# Patient Record
Sex: FEMALE | Race: WHITE | NOT HISPANIC OR LATINO | URBAN - METROPOLITAN AREA
[De-identification: names, ages, dates, MRNs, and addresses within clinical notes are randomized per-mention and may not be internally consistent; named-entity substitution may affect disease eponyms.]

---

## 2017-07-16 ENCOUNTER — EMERGENCY (EMERGENCY)
Facility: HOSPITAL | Age: 52
LOS: 0 days | Discharge: HOME | End: 2017-07-16

## 2017-07-16 DIAGNOSIS — M25.561 PAIN IN RIGHT KNEE: ICD-10-CM

## 2017-08-03 ENCOUNTER — OUTPATIENT (OUTPATIENT)
Dept: OUTPATIENT SERVICES | Facility: HOSPITAL | Age: 52
LOS: 1 days | Discharge: HOME | End: 2017-08-03

## 2017-08-03 DIAGNOSIS — N93.9 ABNORMAL UTERINE AND VAGINAL BLEEDING, UNSPECIFIED: ICD-10-CM

## 2018-10-04 ENCOUNTER — EMERGENCY (EMERGENCY)
Facility: HOSPITAL | Age: 53
LOS: 0 days | Discharge: HOME | End: 2018-10-04
Attending: EMERGENCY MEDICINE | Admitting: EMERGENCY MEDICINE

## 2018-10-04 VITALS — WEIGHT: 139.99 LBS | HEIGHT: 63 IN

## 2018-10-04 VITALS
RESPIRATION RATE: 18 BRPM | DIASTOLIC BLOOD PRESSURE: 84 MMHG | OXYGEN SATURATION: 98 % | HEART RATE: 66 BPM | SYSTOLIC BLOOD PRESSURE: 131 MMHG
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
## 2018-10-04 DIAGNOSIS — M79.621 PAIN IN RIGHT UPPER ARM: ICD-10-CM
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
## 2018-10-04 DIAGNOSIS — M62.838 OTHER MUSCLE SPASM: ICD-10-CM

## 2018-10-04 DIAGNOSIS — S29.012A STRAIN OF MUSCLE AND TENDON OF BACK WALL OF THORAX, INITIAL ENCOUNTER: ICD-10-CM
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
## 2018-10-04 DIAGNOSIS — M54.9 DORSALGIA, UNSPECIFIED: ICD-10-CM

## 2018-10-04 RX ORDER — TIZANIDINE 4 MG/1
1 TABLET ORAL
Qty: 12 | Refills: 0 | OUTPATIENT
Start: 2018-10-04 | End: 2018-10-06

## 2018-10-04 NOTE — ED PROVIDER NOTE - PHYSICAL EXAMINATION
Vital Signs: I have reviewed the initial vital signs.  Constitutional: well-nourished, no acute distress, normocephalic  Eyes: PERRLA, EOMI, no nystagmus, clear conjunctiva  ENT: MMM, TM b/l clear , no nasal congestion  Cardiovascular: regular rate, regular rhythm, no murmur appreciated  Respiratory: unlabored respiratory effort, clear to auscultation bilaterally  Gastrointestinal: soft, non-tender, non-distended  abdomen, no pulsatile mass  Musculoskeletal: supple neck, no lower extremity edema, no bony tenderness , +tenderness to left trapezius   Integumentary: warm, dry, no rash  Neurologic: awake, alert, cranial nerves II-XII grossly intact, extremities’ motor and sensory functions grossly intact, no focal deficits, GCS 15  Psychiatric: appropriate mood, appropriate affect
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
## 2018-10-04 NOTE — ED PROVIDER NOTE - NS ED ROS FT
Review of Systems    Constitutional: (-) fever/ chills (-) weight loss  Eyes/ENT: (-) blurry vision, (-) epistaxis (-) sore throat (-) ear pain  Cardiovascular: (-) chest pain, (-) syncope (-) palpitations  Respiratory: (-) cough, (-) shortness of breath  Gastrointestinal: (-) vomiting, (-) diarrhea (-) abdominal pain  Musculoskeletal: (-) neck pain, (+) back pain, (-) joint pain (-) pedal edema   Integumentary: (-) rash, (-) swelling  Neurological: (-) headache, (-) altered mental status  Psychiatric: (-) hallucinations or depression   Allergic/Immunologic: (-) pruritus

## 2022-03-30 ENCOUNTER — EMERGENCY (EMERGENCY)
Facility: HOSPITAL | Age: 57
LOS: 0 days | Discharge: HOME | End: 2022-03-30
Attending: EMERGENCY MEDICINE | Admitting: EMERGENCY MEDICINE
Payer: COMMERCIAL

## 2022-03-30 VITALS
HEIGHT: 63 IN | SYSTOLIC BLOOD PRESSURE: 124 MMHG | DIASTOLIC BLOOD PRESSURE: 61 MMHG | WEIGHT: 130.95 LBS | TEMPERATURE: 97 F | OXYGEN SATURATION: 100 % | HEART RATE: 83 BPM | RESPIRATION RATE: 18 BRPM

## 2022-03-30 VITALS
RESPIRATION RATE: 18 BRPM | HEART RATE: 81 BPM | SYSTOLIC BLOOD PRESSURE: 130 MMHG | OXYGEN SATURATION: 99 % | TEMPERATURE: 97 F | DIASTOLIC BLOOD PRESSURE: 72 MMHG
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
## 2022-03-30 DIAGNOSIS — R51.9 HEADACHE, UNSPECIFIED: ICD-10-CM

## 2022-03-30 DIAGNOSIS — S09.90XA UNSPECIFIED INJURY OF HEAD, INITIAL ENCOUNTER: ICD-10-CM
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
## 2022-03-30 PROCEDURE — 72125 CT NECK SPINE W/O DYE: CPT | Mod: 26,MA

## 2022-03-30 PROCEDURE — 70450 CT HEAD/BRAIN W/O DYE: CPT | Mod: 26,MA

## 2022-03-30 PROCEDURE — 99285 EMERGENCY DEPT VISIT HI MDM: CPT

## 2022-03-30 RX ORDER — ONDANSETRON 8 MG/1
4 TABLET, FILM COATED ORAL ONCE
Refills: 0 | Status: COMPLETED | OUTPATIENT
Start: 2022-03-30 | End: 2022-03-30

## 2022-03-30 RX ORDER — ACETAMINOPHEN 500 MG
650 TABLET ORAL ONCE
Refills: 0 | Status: COMPLETED | OUTPATIENT
Start: 2022-03-30 | End: 2022-03-30

## 2022-03-30 RX ADMIN — ONDANSETRON 4 MILLIGRAM(S): 8 TABLET, FILM COATED ORAL at 20:37

## 2022-03-30 RX ADMIN — Medication 650 MILLIGRAM(S): at 20:37

## 2022-03-30 NOTE — ED PROVIDER NOTE - NS ED ROS FT
Review of Systems:  	•	CONSTITUTIONAL - no fever, no diaphoresis, no chills    	•	RESPIRATORY - no shortness of breath, no cough  	•	CARDIAC - no chest pain, no palpitations    	•	MUSCULOSKELETAL - no joint paint, no swelling, no redness  	•	NEUROLOGIC - no weakness, positive headache, no paresthesias, no LOC
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
## 2022-03-30 NOTE — ED PROVIDER NOTE - NSFOLLOWUPCLINICS_GEN_ALL_ED_FT
[facility] Concussion Program  Concussion Program  [address] [place] [address]   Phone: [phone]  Fax:

## 2022-03-30 NOTE — ED PROVIDER NOTE - NS ED ATTENDING STATEMENT MOD
This was a shared visit with the JUSTINA. I reviewed and verified the documentation and independently performed the documented:
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
## 2022-03-30 NOTE — ED PROVIDER NOTE - PATIENT PORTAL LINK FT
You can access the FollowMyHealth Patient Portal offered by [facility] by registering at the following website: [email or web address]. By joining [employer]’s FollowMyHealth portal, you will also be able to view your health information using other applications (apps) compatible with our system.

## 2022-03-30 NOTE — ED PROVIDER NOTE - PHYSICAL EXAMINATION
--EXAM--  VITAL SIGNS: I have reviewed vs documented at present.  CONSTITUTIONAL: Well-developed; well-nourished; in no acute distress.     NECK: Supple; non tender.  CARD: S1, S2, Regular rate and rhythm.   RESP: No wheezes, rales or rhonchi.    NEURO: Alert, oriented, grossly unremarkable. Strength 5/5 in all extremities. Sensation intact throughout.  PSYCH: Cooperative, appropriate.

## 2022-05-15 ENCOUNTER — EMERGENCY (EMERGENCY)
Facility: HOSPITAL | Age: 57
LOS: 0 days | Discharge: HOME | End: 2022-05-15
Attending: EMERGENCY MEDICINE | Admitting: EMERGENCY MEDICINE
Payer: COMMERCIAL

## 2022-05-15 VITALS
DIASTOLIC BLOOD PRESSURE: 72 MMHG | SYSTOLIC BLOOD PRESSURE: 134 MMHG | HEART RATE: 72 BPM | OXYGEN SATURATION: 100 % | RESPIRATION RATE: 16 BRPM

## 2022-05-15 VITALS
HEART RATE: 77 BPM | RESPIRATION RATE: 18 BRPM | SYSTOLIC BLOOD PRESSURE: 183 MMHG | TEMPERATURE: 97 F | WEIGHT: 130.07 LBS | DIASTOLIC BLOOD PRESSURE: 96 MMHG | HEIGHT: 63 IN | OXYGEN SATURATION: 100 %

## 2022-05-15 DIAGNOSIS — R42 DIZZINESS AND GIDDINESS: ICD-10-CM

## 2022-05-15 DIAGNOSIS — R11.0 NAUSEA: ICD-10-CM

## 2022-05-15 DIAGNOSIS — R51.9 HEADACHE, UNSPECIFIED: ICD-10-CM

## 2022-05-15 LAB
ALBUMIN SERPL ELPH-MCNC: 4.7 G/DL — SIGNIFICANT CHANGE UP (ref 3.5–5.2)
ALP SERPL-CCNC: 52 U/L — SIGNIFICANT CHANGE UP (ref 30–115)
ALT FLD-CCNC: 14 U/L — SIGNIFICANT CHANGE UP (ref 0–41)
ANION GAP SERPL CALC-SCNC: 11 MMOL/L — SIGNIFICANT CHANGE UP (ref 7–14)
AST SERPL-CCNC: 19 U/L — SIGNIFICANT CHANGE UP (ref 0–41)
BASOPHILS # BLD AUTO: 0.05 K/UL — SIGNIFICANT CHANGE UP (ref 0–0.2)
BASOPHILS NFR BLD AUTO: 0.7 % — SIGNIFICANT CHANGE UP (ref 0–1)
BILIRUB SERPL-MCNC: 0.3 MG/DL — SIGNIFICANT CHANGE UP (ref 0.2–1.2)
BUN SERPL-MCNC: 11 MG/DL — SIGNIFICANT CHANGE UP (ref 10–20)
CALCIUM SERPL-MCNC: 9.3 MG/DL — SIGNIFICANT CHANGE UP (ref 8.5–10.1)
CHLORIDE SERPL-SCNC: 98 MMOL/L — SIGNIFICANT CHANGE UP (ref 98–110)
CO2 SERPL-SCNC: 26 MMOL/L — SIGNIFICANT CHANGE UP (ref 17–32)
CREAT SERPL-MCNC: 0.8 MG/DL — SIGNIFICANT CHANGE UP (ref 0.7–1.5)
EGFR: 87 ML/MIN/1.73M2 — SIGNIFICANT CHANGE UP
EOSINOPHIL # BLD AUTO: 0.08 K/UL — SIGNIFICANT CHANGE UP (ref 0–0.7)
EOSINOPHIL NFR BLD AUTO: 1.2 % — SIGNIFICANT CHANGE UP (ref 0–8)
GLUCOSE SERPL-MCNC: 121 MG/DL — HIGH (ref 70–99)
HCG SERPL QL: NEGATIVE — SIGNIFICANT CHANGE UP
HCT VFR BLD CALC: 35.3 % — LOW (ref 37–47)
HGB BLD-MCNC: 11.9 G/DL — LOW (ref 12–16)
IMM GRANULOCYTES NFR BLD AUTO: 0.1 % — SIGNIFICANT CHANGE UP (ref 0.1–0.3)
LYMPHOCYTES # BLD AUTO: 1.59 K/UL — SIGNIFICANT CHANGE UP (ref 1.2–3.4)
LYMPHOCYTES # BLD AUTO: 23.6 % — SIGNIFICANT CHANGE UP (ref 20.5–51.1)
MAGNESIUM SERPL-MCNC: 1.9 MG/DL — SIGNIFICANT CHANGE UP (ref 1.8–2.4)
MCHC RBC-ENTMCNC: 29.6 PG — SIGNIFICANT CHANGE UP (ref 27–31)
MCHC RBC-ENTMCNC: 33.7 G/DL — SIGNIFICANT CHANGE UP (ref 32–37)
MCV RBC AUTO: 87.8 FL — SIGNIFICANT CHANGE UP (ref 81–99)
MONOCYTES # BLD AUTO: 0.36 K/UL — SIGNIFICANT CHANGE UP (ref 0.1–0.6)
MONOCYTES NFR BLD AUTO: 5.3 % — SIGNIFICANT CHANGE UP (ref 1.7–9.3)
NEUTROPHILS # BLD AUTO: 4.66 K/UL — SIGNIFICANT CHANGE UP (ref 1.4–6.5)
NEUTROPHILS NFR BLD AUTO: 69.1 % — SIGNIFICANT CHANGE UP (ref 42.2–75.2)
NRBC # BLD: 0 /100 WBCS — SIGNIFICANT CHANGE UP (ref 0–0)
PLATELET # BLD AUTO: 249 K/UL — SIGNIFICANT CHANGE UP (ref 130–400)
POTASSIUM SERPL-MCNC: 4.3 MMOL/L — SIGNIFICANT CHANGE UP (ref 3.5–5)
POTASSIUM SERPL-SCNC: 4.3 MMOL/L — SIGNIFICANT CHANGE UP (ref 3.5–5)
PROT SERPL-MCNC: 6.6 G/DL — SIGNIFICANT CHANGE UP (ref 6–8)
RBC # BLD: 4.02 M/UL — LOW (ref 4.2–5.4)
RBC # FLD: 12.2 % — SIGNIFICANT CHANGE UP (ref 11.5–14.5)
SARS-COV-2 RNA SPEC QL NAA+PROBE: SIGNIFICANT CHANGE UP
SODIUM SERPL-SCNC: 135 MMOL/L — SIGNIFICANT CHANGE UP (ref 135–146)
TROPONIN T SERPL-MCNC: <0.01 NG/ML — SIGNIFICANT CHANGE UP
WBC # BLD: 6.75 K/UL — SIGNIFICANT CHANGE UP (ref 4.8–10.8)
WBC # FLD AUTO: 6.75 K/UL — SIGNIFICANT CHANGE UP (ref 4.8–10.8)

## 2022-05-15 PROCEDURE — 70450 CT HEAD/BRAIN W/O DYE: CPT | Mod: 26,MA

## 2022-05-15 PROCEDURE — 93010 ELECTROCARDIOGRAM REPORT: CPT

## 2022-05-15 PROCEDURE — 99285 EMERGENCY DEPT VISIT HI MDM: CPT

## 2022-05-15 RX ORDER — SODIUM CHLORIDE 9 MG/ML
1000 INJECTION, SOLUTION INTRAVENOUS ONCE
Refills: 0 | Status: COMPLETED | OUTPATIENT
Start: 2022-05-15 | End: 2022-05-15

## 2022-05-15 RX ORDER — ONDANSETRON 8 MG/1
1 TABLET, FILM COATED ORAL
Qty: 6 | Refills: 0
Start: 2022-05-15 | End: 2022-05-16

## 2022-05-15 RX ORDER — MECLIZINE HCL 12.5 MG
50 TABLET ORAL ONCE
Refills: 0 | Status: COMPLETED | OUTPATIENT
Start: 2022-05-15 | End: 2022-05-15

## 2022-05-15 RX ORDER — METOCLOPRAMIDE HCL 10 MG
10 TABLET ORAL ONCE
Refills: 0 | Status: COMPLETED | OUTPATIENT
Start: 2022-05-15 | End: 2022-05-15

## 2022-05-15 RX ORDER — MECLIZINE HCL 12.5 MG
1 TABLET ORAL
Qty: 9 | Refills: 0
Start: 2022-05-15 | End: 2022-05-17

## 2022-05-15 RX ADMIN — SODIUM CHLORIDE 1000 MILLILITER(S): 9 INJECTION, SOLUTION INTRAVENOUS at 12:37

## 2022-05-15 RX ADMIN — Medication 50 MILLIGRAM(S): at 12:37

## 2022-05-15 RX ADMIN — Medication 10 MILLIGRAM(S): at 12:37

## 2022-05-15 NOTE — ED PROVIDER NOTE - PATIENT PORTAL LINK FT
You can access the FollowMyHealth Patient Portal offered by [facility] by registering at the following website: [email or web address]. By joining [employer]’s FollowMyHealth portal, you will also be able to view your health information using other applications (apps) compatible with our system.

## 2022-05-15 NOTE — ED PROVIDER NOTE - CARE PROVIDER_API CALL
Karl Vu)  Otolaryngology  [address] [place] [address], [address] [place] [address]  Phone: [phone]  Fax: [phone]  Follow Up Time: 1-3 Days
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
## 2022-05-15 NOTE — ED PROVIDER NOTE - PROGRESS NOTE DETAILS
alisa: patient with moderate improvement of symptoms. patient offered CTA, MR and neuro eval. patient states she feels well enough to be discharged and would like to go home to fu w/ outpatient neurologist. patient with steady gait, no focal deficits. I personally evaluated the patient. I reviewed the Resident’s or Physician Assistant’s note (as assigned above), and agree with the findings and plan except as documented in my note.  58 y/o F presents with dizziness that worsened this morning. Pt states the dizziness is not room spinning in nature. Pt reports the dizziness is made worse with positions such as sitting up and walking. Denies fever, chills, CP, SOB, abdominal pain, n/v, numbness, weakness and tingling. Denies vision changes, tinnitus and difficulty speaking. On exam: NCAT. PERRLA, EOMI. OP clear. Lungs CTAB. RRR, S1S2 noted. Radial pulses 2+ and equal, pedal pulses 2+ and equal. Abdomen soft, NT/ND, no rebound or guarding. FROM x4 extremities. No focal neuro deficits, CN 2-12 grossly intact. Strength 5/5, sensations intact. Romberg negative. Pt sxs are reproducible with positional changes. Pt is able to ambulate. A/P: labs including Troponin, EKG, CT head, CXR. Pt treated with Meclizine and IVF. Will continue to monitor.

## 2022-05-15 NOTE — ED PROVIDER NOTE - ATTENDING APP SHARED VISIT CONTRIBUTION OF CARE
I personally evaluated the patient. I reviewed the Resident’s or Physician Assistant’s note (as assigned above), and agree with the findings and plan except as documented in my note.  58 y/o F presents with dizziness that worsened this morning. Pt states the dizziness is not room spinning in nature. Pt reports the dizziness is made worse with positions such as sitting up and walking. Denies fever, chills, CP, SOB, abdominal pain, n/v, numbness, weakness and tingling. Denies vision changes, tinnitus and difficulty speaking. On exam: NCAT. PERRLA, EOMI. OP clear. Lungs CTAB. RRR, S1S2 noted. Radial pulses 2+ and equal, pedal pulses 2+ and equal. Abdomen soft, NT/ND, no rebound or guarding. FROM x4 extremities. No focal neuro deficits, CN 2-12 grossly intact. Strength 5/5, sensations intact. Romberg negative. Pt sxs are reproducible with positional changes. Pt is able to ambulate. A/P: labs including Troponin, EKG, CT head, CXR. Pt treated with Meclizine and IVF. Will continue to monitor.

## 2022-05-15 NOTE — ED PROVIDER NOTE - NS ED ROS FT
Review of Systems:  	•	CONSTITUTIONAL: no fever   	•	SKIN: no rash   	•	EYES: no eye pain, no blurry vision  	•	ENT: no sore throat, no tinnitus, no hearing changes   	•	RESPIRATORY: no shortness of breath, no cough  	•	CARDIAC: no chest pain, no palpitations  	•	GI: no abd pain, +nausea, no vomiting, no diarrhea   	•	MUSCULOSKELETAL: no joint paint, no swelling, no redness  	•	NEUROLOGIC: +headache, +lightheadedness, no syncope, no numbness/paresthesias   	•	PSYCH: no anxiety, non suicidal, non homicidal, no hallucination, no depression
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
## 2022-05-15 NOTE — ED PROVIDER NOTE - PHYSICAL EXAMINATION
CONSTITUTIONAL: Well-developed; well-nourished; in no acute distress, nontoxic appearing  SKIN: skin exam is warm and dry  HEAD: Normocephalic; atraumatic  EYES: PERRL, 3 mm bilateral, no nystagmus, EOM intact; conjunctiva and sclera clear.  ENT: MMM, no nasal congestion  NECK: ROM intact, no midline c-spine tenderness   CARD: S1, S2 normal, no murmur  RESP: No wheezes, rales or rhonchi. Good air movement bilaterally  ABD: soft; non-distended; non-tender.   EXT: Normal ROM. No cyanosis or edema.    NEURO: awake, alert, following commands, oriented, grossly unremarkable. No Focal deficits. GCS 15. Normal finger to nose. steady gait. CN 2-12 intact.   PSYCH: Cooperative, appropriate.

## 2022-05-15 NOTE — ED PROVIDER NOTE - OBJECTIVE STATEMENT
57 year old female, n past medical history, who presents with lightheadedness. patient endorses lightheadedness and nausea that began x2 hours PTA. patient reports gradual onset of symptoms prompting ed eval. reports associated frontal headache. denies fever, chills, vision changes, neck pain, tinnitus, chest pain, shortness of breath, abd pain, diarrhea, urinary symptoms, numbness/weakness/paresthesias. no recent falls.

## 2022-05-15 NOTE — ED ADULT NURSE NOTE - OBJECTIVE STATEMENT
as per pt, "i've has a headache and dizziness for 1.5 hours". pt denies room spinning. pt able to ambulate steady, denies weakness.

## 2022-05-15 NOTE — ED PROVIDER NOTE - NSFOLLOWUPCLINICS_GEN_ALL_ED_FT
[facility]  Neurology  [address] [place] [address], [facility] [address]  [place] [address]  Phone: [phone]  Fax:   Follow Up Time: 1-3 Days

## 2023-01-24 ENCOUNTER — APPOINTMENT (OUTPATIENT)
Dept: ORTHOPEDIC SURGERY | Facility: CLINIC | Age: 58
End: 2023-01-24
Payer: COMMERCIAL

## 2023-01-24 VITALS — BODY MASS INDEX: 23.92 KG/M2 | HEIGHT: 63 IN | WEIGHT: 135 LBS

## 2023-01-24 DIAGNOSIS — M25.561 PAIN IN RIGHT KNEE: ICD-10-CM

## 2023-01-24 DIAGNOSIS — M25.461 EFFUSION, RIGHT KNEE: ICD-10-CM

## 2023-01-24 PROBLEM — Z00.00 ENCOUNTER FOR PREVENTIVE HEALTH EXAMINATION: Status: ACTIVE | Noted: 2023-01-24

## 2023-01-24 PROCEDURE — 99203 OFFICE O/P NEW LOW 30 MIN: CPT | Mod: 25

## 2023-01-24 PROCEDURE — 73562 X-RAY EXAM OF KNEE 3: CPT | Mod: RT

## 2023-01-24 PROCEDURE — 20610 DRAIN/INJ JOINT/BURSA W/O US: CPT | Mod: RT

## 2023-01-24 NOTE — PROCEDURE
[Large Joint Injection] : Large joint injection [Right] : of the right [Knee] : knee [Pain] : pain [___ cc    1%] : Lidocaine ~Vcc of 1%  [___ cc    4mg] : Dexamethasone (Decadron) ~Vcc of 4 mg  [] : Patient tolerated procedure well

## 2023-01-25 NOTE — PHYSICAL EXAM
[de-identified] : On examination of the right knee, patient has minimal effusion, no ecchymosis, no erythema.\par Patient has range of motion of the knee due to pain and swelling.\par Equivocal Layo's.\par No signs of instability.\par Neurovascular intact.\par \par X-ray of the right knee was negative for any acute fracture or dislocation
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
## 2023-01-25 NOTE — DISCUSSION/SUMMARY
[de-identified] : Impression: Right knee pain possibly due to chondromalacia.\par \par Plan: She was requesting a cortisone injection, I agree, I tried to aspirate the knee, no fluid was aspirated, course injection was given.\par \par Follow-up: Follow-up as needed.\par \par Supervising physician Dr. Blount

## 2023-03-15 RX ORDER — MELOXICAM 15 MG/1
15 TABLET ORAL
Qty: 30 | Refills: 1 | Status: ACTIVE | COMMUNITY
Start: 2023-01-24 | End: 1900-01-01

## 2024-07-24 ENCOUNTER — APPOINTMENT (OUTPATIENT)
Dept: ORTHOPEDIC SURGERY | Facility: CLINIC | Age: 59
End: 2024-07-24
Payer: COMMERCIAL

## 2024-07-24 VITALS — WEIGHT: 134 LBS | BODY MASS INDEX: 23.74 KG/M2 | HEIGHT: 63 IN

## 2024-07-24 DIAGNOSIS — S83.281A OTHER TEAR OF LATERAL MENISCUS, CURRENT INJURY, RIGHT KNEE, INITIAL ENCOUNTER: ICD-10-CM

## 2024-07-24 DIAGNOSIS — Z78.9 OTHER SPECIFIED HEALTH STATUS: ICD-10-CM

## 2024-07-24 DIAGNOSIS — M17.31 UNILATERAL POST-TRAUMATIC OSTEOARTHRITIS, RIGHT KNEE: ICD-10-CM

## 2024-07-24 PROCEDURE — 99203 OFFICE O/P NEW LOW 30 MIN: CPT

## 2024-07-24 PROCEDURE — 99213 OFFICE O/P EST LOW 20 MIN: CPT

## 2024-07-24 PROCEDURE — 73522 X-RAY EXAM HIPS BI 3-4 VIEWS: CPT

## 2024-07-24 PROCEDURE — 73564 X-RAY EXAM KNEE 4 OR MORE: CPT | Mod: RT

## 2024-07-24 RX ORDER — MELOXICAM 15 MG/1
15 TABLET ORAL DAILY
Qty: 14 | Refills: 1 | Status: ACTIVE | COMMUNITY
Start: 2024-07-24 | End: 1900-01-01

## 2024-07-24 NOTE — PHYSICAL EXAM
[de-identified] : Patient walks with a slight limp.  Left knee exam is unremarkable.  Right knee is anatomically aligned.  There is a 15 degree flexion contracture.  There is slight lateral laxity.  There is pain with range of motion, and tenderness palpation over the lateral joint line.  There is crepitus on patellar grind test.  There is no instability, no significant effusion. [de-identified] :  Bilateral hip and knee x-rays taken in office today show Mild osteoarthritic changes of bilateral hips. Mild osteoarthritic changes of the left knee. Moderate changes of the lateral compartment of the right knee.  There is loss of joint space and osteophyte formation.

## 2024-07-24 NOTE — PROCEDURE
[de-identified] :  Discussed again with the patient the planned    KENALOG          injection. The risks, benefits, convalescence and alternatives were reviewed. The possible side effects discussed included but were not limited to: pain, swelling, heat and redness. There symptoms are generally mild but if they are extensive then contact the office.   After discussion of the risks and benefits, the patient agreed to their       KENALOG       injection into the RIGHT knee. Confirmed that the patient does not have a history of prior adverse reactions, active infections or relevant allergies. There was no effusion, erythema or warmth present, and the skin was clear. The skin was sterilized with alcohol. Topical anesthesia was achieved with ethyl chloride. Clorhexidine was applied. A 22 gauge needle was inserted into the RIGHT knee via a lateral approach without ultrasound guidance. The injection was completed without complication and a bandage was applied.   The patient tolerated the procedure well and was instructed to avoid strenuous activities for the next 24-48 hours and to use ice, NSAIDs, or Tylenol for pain as needed. The patient will call immediately with any signs of infection or allergic reaction.   Procedure: Injection of the RIGHT  knee joint Used to inject     KENALOG

## 2024-07-24 NOTE — HISTORY OF PRESENT ILLNESS
[None] : No relieving factors are noted [de-identified] : 59-year-old lady presents for follow-up.  She was last seen in my previous practice for right knee pain approximately 3 years ago.  She got significantly better with physical therapy.  Now she reports that the pain has returned.  Pain is constant and is radiating down the leg.  It is associated with swelling.  She describes it as throbbing and shooting.  It is worse with exercise, and nothing makes it better.  It is relatively moderate in severity.  She can walk 6-10 blocks without assistive devices and has no difficulty putting on shoes and socks or using public transportation.  Pain with sitting starts in about 30 minutes. [de-identified] : exercise

## 2024-07-24 NOTE — PROCEDURE
[de-identified] :  Discussed again with the patient the planned    KENALOG          injection. The risks, benefits, convalescence and alternatives were reviewed. The possible side effects discussed included but were not limited to: pain, swelling, heat and redness. There symptoms are generally mild but if they are extensive then contact the office.   After discussion of the risks and benefits, the patient agreed to their       KENALOG       injection into the RIGHT knee. Confirmed that the patient does not have a history of prior adverse reactions, active infections or relevant allergies. There was no effusion, erythema or warmth present, and the skin was clear. The skin was sterilized with alcohol. Topical anesthesia was achieved with ethyl chloride. Clorhexidine was applied. A 22 gauge needle was inserted into the RIGHT knee via a lateral approach without ultrasound guidance. The injection was completed without complication and a bandage was applied.   The patient tolerated the procedure well and was instructed to avoid strenuous activities for the next 24-48 hours and to use ice, NSAIDs, or Tylenol for pain as needed. The patient will call immediately with any signs of infection or allergic reaction.   Procedure: Injection of the RIGHT  knee joint Used to inject     KENALOG

## 2024-07-24 NOTE — HISTORY OF PRESENT ILLNESS
[None] : No relieving factors are noted [de-identified] : 59-year-old lady presents for follow-up.  She was last seen in my previous practice for right knee pain approximately 3 years ago.  She got significantly better with physical therapy.  Now she reports that the pain has returned.  Pain is constant and is radiating down the leg.  It is associated with swelling.  She describes it as throbbing and shooting.  It is worse with exercise, and nothing makes it better.  It is relatively moderate in severity.  She can walk 6-10 blocks without assistive devices and has no difficulty putting on shoes and socks or using public transportation.  Pain with sitting starts in about 30 minutes. [de-identified] : exercise

## 2024-07-24 NOTE — DISCUSSION/SUMMARY
[de-identified] : All signs were discussed with the patient.  We discussed that osteo arthritis is progressing.  She may be candidate for partial unicondylar knee arthroplasty.  To evaluate cartilage and the remaining compartments as well as to rule out other pathology MRI was ordered.  I will follow-up with the patient when results are received.  She will start physical therapy and meloxicam at this time.  We discussed that I do not do unicondylar arthroplasty, and we will be referring her if she needs it.

## 2024-07-24 NOTE — PHYSICAL EXAM
[de-identified] : Patient walks with a slight limp.  Left knee exam is unremarkable.  Right knee is anatomically aligned.  There is a 15 degree flexion contracture.  There is slight lateral laxity.  There is pain with range of motion, and tenderness palpation over the lateral joint line.  There is crepitus on patellar grind test.  There is no instability, no significant effusion. [de-identified] :  Bilateral hip and knee x-rays taken in office today show Mild osteoarthritic changes of bilateral hips. Mild osteoarthritic changes of the left knee. Moderate changes of the lateral compartment of the right knee.  There is loss of joint space and osteophyte formation.

## 2024-07-24 NOTE — DISCUSSION/SUMMARY
[de-identified] : All signs were discussed with the patient.  We discussed that osteo arthritis is progressing.  She may be candidate for partial unicondylar knee arthroplasty.  To evaluate cartilage and the remaining compartments as well as to rule out other pathology MRI was ordered.  I will follow-up with the patient when results are received.  She will start physical therapy and meloxicam at this time.  We discussed that I do not do unicondylar arthroplasty, and we will be referring her if she needs it.

## 2024-07-31 ENCOUNTER — RESULT REVIEW (OUTPATIENT)
Age: 59
End: 2024-07-31

## 2024-07-31 ENCOUNTER — OUTPATIENT (OUTPATIENT)
Dept: OUTPATIENT SERVICES | Facility: HOSPITAL | Age: 59
LOS: 1 days | End: 2024-07-31
Payer: COMMERCIAL

## 2024-07-31 DIAGNOSIS — Z00.8 ENCOUNTER FOR OTHER GENERAL EXAMINATION: ICD-10-CM

## 2024-07-31 DIAGNOSIS — M17.31 UNILATERAL POST-TRAUMATIC OSTEOARTHRITIS, RIGHT KNEE: ICD-10-CM

## 2024-07-31 DIAGNOSIS — S83.281D OTHER TEAR OF LATERAL MENISCUS, CURRENT INJURY, RIGHT KNEE, SUBSEQUENT ENCOUNTER: ICD-10-CM

## 2024-07-31 PROCEDURE — 73721 MRI JNT OF LWR EXTRE W/O DYE: CPT | Mod: 26,RT

## 2024-07-31 PROCEDURE — 73721 MRI JNT OF LWR EXTRE W/O DYE: CPT | Mod: RT

## 2024-08-01 DIAGNOSIS — M17.31 UNILATERAL POST-TRAUMATIC OSTEOARTHRITIS, RIGHT KNEE: ICD-10-CM

## 2024-08-01 DIAGNOSIS — S83.281D OTHER TEAR OF LATERAL MENISCUS, CURRENT INJURY, RIGHT KNEE, SUBSEQUENT ENCOUNTER: ICD-10-CM

## 2024-08-30 ENCOUNTER — APPOINTMENT (OUTPATIENT)
Dept: ORTHOPEDIC SURGERY | Facility: CLINIC | Age: 59
End: 2024-08-30
Payer: COMMERCIAL

## 2024-08-30 DIAGNOSIS — M17.31 UNILATERAL POST-TRAUMATIC OSTEOARTHRITIS, RIGHT KNEE: ICD-10-CM

## 2024-08-30 DIAGNOSIS — S83.281A OTHER TEAR OF LATERAL MENISCUS, CURRENT INJURY, RIGHT KNEE, INITIAL ENCOUNTER: ICD-10-CM

## 2024-08-30 PROCEDURE — 99211 OFF/OP EST MAY X REQ PHY/QHP: CPT

## 2024-08-30 NOTE — HISTORY OF PRESENT ILLNESS
[de-identified] : 59-year-old lady presents via telemedicine for a follow-up and MRI discussion.  She gave explicit consent for the telemedicine visit and logged onto the Teams platform.  She did not do physical therapy due to logistical issues.  She reports improvement with corticosteroid injection and meloxicam, but still has significant amount of pain.  She also reports that she started having pain on the left side due to compensating.

## 2024-08-30 NOTE — DISCUSSION/SUMMARY
[de-identified] : MRI images were reviewed and discussed with the patient.  We discussed meniscal tear and osteoarthritis.  At this point there are no indications for total knee arthroplasty.  Patient will start a course of physical therapy and will follow-up with a  for second opinion after physical therapy course is completed.  Total time of conversation was 3 minutes 41 seconds.

## 2024-09-11 ENCOUNTER — NON-APPOINTMENT (OUTPATIENT)
Age: 59
End: 2024-09-11

## 2024-09-12 ENCOUNTER — APPOINTMENT (OUTPATIENT)
Dept: ORTHOPEDIC SURGERY | Facility: CLINIC | Age: 59
End: 2024-09-12
Payer: COMMERCIAL

## 2024-09-12 VITALS — WEIGHT: 132 LBS | BODY MASS INDEX: 23.39 KG/M2 | HEIGHT: 63 IN

## 2024-09-12 DIAGNOSIS — M54.12 RADICULOPATHY, CERVICAL REGION: ICD-10-CM

## 2024-09-12 PROCEDURE — ZZZZZ: CPT

## 2024-09-12 PROCEDURE — 99213 OFFICE O/P EST LOW 20 MIN: CPT

## 2024-09-12 PROCEDURE — 72050 X-RAY EXAM NECK SPINE 4/5VWS: CPT

## 2024-09-12 RX ORDER — TIZANIDINE 4 MG/1
4 TABLET ORAL
Qty: 30 | Refills: 0 | Status: ACTIVE | COMMUNITY
Start: 2024-09-12 | End: 1900-01-01
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
## 2024-09-13 NOTE — IMAGING
[de-identified] : cervical spine:  + tenderness over L sternocleidomastoid region as well as L trapezius musculature, decreased ROM with lateral rotation and extension due to pain and stiffness, mild edema noted over L sternocleidomastoid region.  X-ray cervical spine 4 views:  no fractures noted, degenerative changes noted.

## 2024-09-13 NOTE — IMAGING
[de-identified] : cervical spine:  + tenderness over L sternocleidomastoid region as well as L trapezius musculature, decreased ROM with lateral rotation and extension due to pain and stiffness, mild edema noted over L sternocleidomastoid region.  X-ray cervical spine 4 views:  no fractures noted, degenerative changes noted.

## 2024-09-20 ENCOUNTER — APPOINTMENT (OUTPATIENT)
Dept: ORTHOPEDIC SURGERY | Facility: CLINIC | Age: 59
End: 2024-09-20
Payer: COMMERCIAL

## 2024-09-20 ENCOUNTER — NON-APPOINTMENT (OUTPATIENT)
Age: 59
End: 2024-09-20

## 2024-09-20 DIAGNOSIS — S16.1XXA STRAIN OF MUSCLE, FASCIA AND TENDON AT NECK LEVEL, INITIAL ENCOUNTER: ICD-10-CM

## 2024-09-20 DIAGNOSIS — M54.16 RADICULOPATHY, LUMBAR REGION: ICD-10-CM

## 2024-09-20 DIAGNOSIS — S39.012A STRAIN OF MUSCLE, FASCIA AND TENDON OF LOWER BACK, INITIAL ENCOUNTER: ICD-10-CM

## 2024-09-20 PROCEDURE — 99214 OFFICE O/P EST MOD 30 MIN: CPT

## 2024-09-20 PROCEDURE — 72110 X-RAY EXAM L-2 SPINE 4/>VWS: CPT

## 2024-09-20 NOTE — IMAGING
[de-identified] : cervical spine: + tenderness over L sternocleidomastoid region as well as L trapezius musculature, decreased ROM with lateral rotation and extension due to pain and stiffness, mild edema noted over L sternocleidomastoid region, 5/5 strength lower extremities, 2+ reflexes.  lumbar spine: + tenderness L paraspinal muscles, 5/5 strength lower extremities, 2+ reflexes, pain with lumbar flexion on the L side, -SLR bilaterally, NV intact.  X-ray lumbar spine 4 views:  sacralized disc at L5-S1, mild loss of disc height  at L3-4, L4-5, mild degenerative changes noted.
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
## 2024-09-20 NOTE — REVIEW OF SYSTEMS
[Arthralgia] : arthralgia [Joint Pain] : joint pain [Joint Stiffness] : joint stiffness [Negative] : Constitutional [de-identified] : L upper and lower extremity parasthesias
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
## 2024-09-27 ENCOUNTER — NON-APPOINTMENT (OUTPATIENT)
Age: 59
End: 2024-09-27

## 2024-10-11 ENCOUNTER — APPOINTMENT (OUTPATIENT)
Dept: ORTHOPEDIC SURGERY | Facility: CLINIC | Age: 59
End: 2024-10-11
Payer: COMMERCIAL

## 2024-10-11 ENCOUNTER — NON-APPOINTMENT (OUTPATIENT)
Age: 59
End: 2024-10-11

## 2024-10-11 DIAGNOSIS — M54.12 RADICULOPATHY, CERVICAL REGION: ICD-10-CM

## 2024-10-11 PROCEDURE — 99213 OFFICE O/P EST LOW 20 MIN: CPT

## 2024-10-28 ENCOUNTER — APPOINTMENT (OUTPATIENT)
Dept: ORTHOPEDIC SURGERY | Facility: CLINIC | Age: 59
End: 2024-10-28

## 2025-04-18 NOTE — ED ADULT NURSE NOTE - DATE OF FIRST COVID-19 BOOSTER
Pt last OV 3/20/2025    Requested Prescriptions     Pending Prescriptions Disp Refills    FLUoxetine (PROzac) 60 mg tablet [Pharmacy Med Name: FLUOXETINE HCL 60 MG TABLET] 90 tablet 3     Sig: Take 1 tablet (60 mg) by mouth once daily.          30-Jan-2022